# Patient Record
(demographics unavailable — no encounter records)

---

## 2024-10-22 NOTE — HISTORY OF PRESENT ILLNESS
[Lower back] : lower back [8] : 8 [Dull/Aching] : dull/aching [Intermittent] : intermittent [Household chores] : household chores [Nothing helps with pain getting better] : Nothing helps with pain getting better [Standing] : standing [Walking] : walking [Lying in bed] : lying in bed [FreeTextEntry1] : L5-S1 LESI - 05/06/2024  LT L4-5, L5-S1 TFESI - 03/19/2024 [] : no [FreeTextEntry6] : soreness [FreeTextEntry7] : left side  [de-identified] : L MRI

## 2024-10-22 NOTE — DISCUSSION/SUMMARY
[de-identified] : proceed L5S1 LESI  After discussing various treatment options with the patient including but not limited to oral medications, physical therapy, exercise, modalities as well as interventional spinal injections, we have decided with the following plan: I personally reviewed the MRI/CT scan images and agree with the radiologist's report. The radiological findings were discussed with the patient. The risks, benefits, contents and alternatives to injection were explained in full to the patient. Risks outlined include but are not limited to infection,sepsis, bleeding, post-dural puncture headache, nerve damage, temporary increase in pain, syncopal episode, failure to resolve symptoms, allergic reaction, symptom recurrence, and elevation of blood sugar in diabetics. Cortisone may cause immunosuppression. Patient understands the risks. All questions were answered. After discussion of options, patient requested an injection. Information regarding the injection was given to the patient. Which medications to stop prior to the injection was explained to the patient as well. Follow up in 1-2 weeks post injection for re-evaluation. Continue Home exercises, stretching, activity modification, physical therapy, and conservative care. Patient is presenting with acute/sub-acute radicular pain with impairment in ADLs and functionality. The pain has not responded to conservative care including nsaid therapy and/or physical therapy. There is no bleeding tendency, unstable medical condition, or systemic infection.

## 2024-10-22 NOTE — PHYSICAL EXAM

## 2024-11-04 NOTE — PROCEDURE
[FreeTextEntry3] : Date of Service: 11/04/2024   Account: [id number]   Patient: NURA ASHLEY   YOB: 1958   Age: 66 year     Surgeon:                                   Ke Cerrato M.D.   Pre-Operative Diagnosis:      Lumbosacral radiculitis                Post Operative Diagnosis:     Lumbosacral radiculitis                Procedure:                               Interlaminar lumbar epidural steroid injection (L5-S1) under fluoroscopic guidance   Anesthesia:                              MAC     This procedure was carried out using fluoroscopic guidance.  The risks and benefits of the procedure were discussed extensively with the patient. The consent of the patient was obtained and the following procedure was performed.   The patient was placed in the prone position.  The lumbar area was prepped and draped in a sterile fashion.  Under AP view with slight cephalad-caudad angulation, the L5-S1 interspace was identified and marked.  Using sterile technique the superficial skin was anesthetized with 1% Lidocaine without epinephrine.  A 20 gauge Tuohoy needle was advanced into the epidural space under fluoroscopy using eydxz-cguqncisn-cjiot technique and using loss of resistance at the L5-S1 level.  After negative aspiration for heme or CSF, an epidurogram was obtained using 3 cc Omnipaque contrast confirming epidural placement of the needle.   Epidurogram showed no evidence of intrathecal or intravascular flow, and good evidence of bilateral epidural flow from L3-S2 levels.  After this, 5 cc of preservative free normal saline and 80 mg of kenalog were injected into the epidural space.   The needle was subsequently removed.  Anesthesia personnel were present throughout the procedure.   The patient tolerated the procedure well and was instructed to contact me immediately if there were any problems.     Ke Cerrato M.D.

## 2024-12-13 NOTE — PROCEDURE
[FreeTextEntry3] : RIGHT SCIATIC NERVE BLOCK/PIRIFORMIS INJECTION   Pre-op diagnosis: Piriformis syndrome of left  side   Post Operative Diagnosis: Piriformis syndrome of left side   Procedure: Left Sciatic nerve Block/Piriformis injection under fluoroscopic guidance.        This procedure was carried out using fluoroscopic guidance. The risks and benefits of the procedure were discussed extensively with the patient. The consent of the patient was obtained and the following procedure was performed.   The patient was placed prone on the table. Fluoroscopy was utilized to delineate the anatomy of the sacrum and the left greater trochanter. Surface and deep landmarks were identified as well. After skin prep, drape, and local infiltration of 1.5% lidocaine, a 22 gauge spinal needle was advanced approximately half way between the sacrum and the greater trochanter. The needle was passed to a depth of maximal tenderness or until oss was contacted. 1 cc of Omnipaque was injected and lateral spread delineating the piriformis muscle was overserved. No vascular spread was noted. 40mg of kenalog with 3 cc of 0.25% Marcaine was then injected.   The patient tolerated the procedure well. Vital signs remained normal throughout the procedure. The patient was instructed to apply ice over the injection site for twenty minutes every two hours for the next 24 to 48 hours. The patient was also instructed to contact me immediately if there were any problems     Ke Cerrato M.D.

## 2025-03-13 NOTE — HISTORY OF PRESENT ILLNESS
[Lower back] : lower back [8] : 8 [Dull/Aching] : dull/aching [Intermittent] : intermittent [Household chores] : household chores [Nothing helps with pain getting better] : Nothing helps with pain getting better [Standing] : standing [Walking] : walking [Lying in bed] : lying in bed [7] : 7 [FreeTextEntry1] : LT PIRIFORMIS INJ-12/13/24  L5-S1 LESI-11/04/25  L5-S1 LESI - 05/06/2024  LT L4-5, L5-S1 TFESI - 03/19/2024 [] : no [FreeTextEntry6] : soreness [FreeTextEntry7] : left side  [de-identified] : L MRI

## 2025-03-13 NOTE — ASSESSMENT
[FreeTextEntry1] : L SCIATIC NERVE/PIRIFORMIS BLOCK WITHOUT RELIEF    PAIN IS IN L GLUT  POST THIGH AND ANKLE

## 2025-03-13 NOTE — PHYSICAL EXAM
[de-identified] : PHYSICAL EXAM  Constitutional:  Appears well, no apparent distress Ability to communicate: Normal Respiratory: non-labored breathing Skin: no rash noted Head: normocephalic, atraumatic Neck: no visible thyroid enlargement Eyes: extraocular movements intact Neurologic: alert and oriented x3 Psychiatric: normal mood, affect, and behavior  Lumbar Spine:  Palpation: left lumbar paraspinal spasm and left lumbar paraspinal tenderness to palpation. ROM: Diminished range of motion in all plains.  Patient notes pain with lateral bending to the left. MMT: Motor exam is 5/5 through out bilateral lower extremities. Sensation: Light touch and pain is intact throughout bilateral lower extremities. Reflexes: achilles and patella reflexes are intact and  symmetrical.  No sustained clonus. Special Testing: Positive straight leg raise on the left side.

## 2025-03-13 NOTE — DISCUSSION/SUMMARY
[de-identified] : pt will f.u dr serna for possible hardware removal will obtain updated MRI LS spine

## 2025-03-17 NOTE — HISTORY OF PRESENT ILLNESS
[5] : 5 [Dull/Aching] : dull/aching [Throbbing] : throbbing [Constant] : constant [de-identified] : 8/30/22: fall 2 days ago w/ foot pain. went to walk in and placed in boot. nwb in boot. no prior foot probs. no dm. quit cigarettes 2 years ago. quit vaping 1 year ago. CPA/  9/2/22: ORIF L 5th mt fx  09/12/2022:  no complaints. stepped on foot a few times while transporting items despite instructions to be nwb. Patient denies fevers, chills, or drainage.   09/29/2022: pain improving. nwb in post op shoe on rollator. Patient denies fevers, chills, or drainage.  10/10/2022:  having some discomfort, especially as the day goes on.  Had to put foot down once to keep from falling.   NWB w scooter w/ post op shoe. Patient denies fevers, chills, or drainage.  10/18/2022:  Had a fall yesterday morning and put her left foot down hard. was not wearing show at the time  10/31/2022:  pain improving. walking in boot w/ 1 crutch  11/21/2022 :  improving. walking in reg shoes. requesting PT for gait issues. Patient denies fevers, chills, or drainage.  12/19/2022: was having some pain in dorsal foot assoc w/ PT. pain now improved  2/13/2023: improving. some plantar heep/foot pain w/ activity. going to PT 1 day a week  04/24/23: going PT 1/wk. no new injury. reports pain localizing to achilles. also requesting hardware removal for pain related to shoeware  05/01/2023:  MRI results  5/16/23: PRP L achilles today  01/08/2024:  not seen in nearly 1 year. reports continued foot pain. reports ongoing tingling extending from hip down. seeing chiropractor for that  2/12/24 -- MRI f/up. no sig change  03/17/2025: not seen in a year. has been going to pain management. has ongoing pain radiating from foot to leg and posterior thigh [] : This patient has had an injection before: no [FreeTextEntry1] : left foot [de-identified] : post op shoe and scooter  [de-identified] : T[facility]  [de-identified] : 9/2/22 [de-identified] : 2/8/23 [de-identified] : xray

## 2025-03-17 NOTE — PHYSICAL EXAM
[Left] : left foot and ankle [2nd] : 2nd [3rd] : 3rd [5th] : 5th [NL (40)] : plantar flexion 40 degrees [NL 30)] : inversion 30 degrees [NL (20)] : eversion 20 degrees [5___] : [facility] 5[unfilled]/5 [2+] : dorsalis pedis pulse: 2+ [] : no metatarsal tenderness [FreeTextEntry8] : no point ttp [de-identified] : splt all distrib but dec at 1st webspace

## 2025-03-17 NOTE — ASSESSMENT
[FreeTextEntry1] : sx appear more radic and not from hardware had lumbar mri pending advised dont think removing plate would help current complaint further plan pending mri patient considering spine surgery eval as well

## 2025-05-08 NOTE — HISTORY OF PRESENT ILLNESS
[Lower back] : lower back [8] : 8 [7] : 7 [Dull/Aching] : dull/aching [Intermittent] : intermittent [Household chores] : household chores [Nothing helps with pain getting better] : Nothing helps with pain getting better [Standing] : standing [Walking] : walking [Lying in bed] : lying in bed [FreeTextEntry1] : MRI follow up-5/8/25  LT PIRIFORMIS INJ-12/13/24  L5-S1 LESI-11/04/25  L5-S1 LESI - 05/06/2024  LT L4-5, L5-S1 TFESI - 03/19/2024 [] : no [FreeTextEntry6] : soreness [FreeTextEntry7] : left side  [de-identified] : L MRI

## 2025-05-08 NOTE — PHYSICAL EXAM
[de-identified] : PHYSICAL EXAM  Constitutional:  Appears well, no apparent distress Ability to communicate: Normal Respiratory: non-labored breathing Skin: no rash noted Head: normocephalic, atraumatic Neck: no visible thyroid enlargement Eyes: extraocular movements intact Neurologic: alert and oriented x3 Psychiatric: normal mood, affect, and behavior  Lumbar Spine:  Palpation: left lumbar paraspinal spasm and left lumbar paraspinal tenderness to palpation. ROM: Diminished range of motion in all plains.  Patient notes pain with lateral bending to the left. MMT: Motor exam is 5/5 through out bilateral lower extremities. Sensation: Light touch and pain is intact throughout bilateral lower extremities. Reflexes: achilles and patella reflexes are intact and  symmetrical.  No sustained clonus. Special Testing: Positive straight leg raise on the left side.  Assessemnt: Radiculopathy of lumbosacral region (M54.17) Myalgia (M79.10)

## 2025-05-15 NOTE — REASON FOR VISIT
[FreeTextEntry2] : 05/15/2025: Patient here for raven hand pain. Patient states the right hand hurts the most.  Patient states she has had surgery on both hands before in the past. Patient states she feels like something is torn in her hand because she is having the same pain like before.

## 2025-05-15 NOTE — HISTORY OF PRESENT ILLNESS
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

## 2025-05-15 NOTE — ASSESSMENT
[FreeTextEntry1] : This is an acute uncomplicated injury that needs NSIADS, ice  I recommend the patient take over the counter medication such as Advil/Motrin/Aleve or Tylenol for pain and inflammation  MRIs both wrists to eval TFCC Return after MRIs  R ECU tendon sheath injection was performed because of pain and inflammation under aseptic conditions with betadine and alcohol Anesthesia: ethyl chloride sprayed topically Celestone 6mg/1cc: An injection of Celestone 1cc Lidocaine: An injection of Lidocaine 1% 1cc Marcaine: An injection of Marcaine 0.5% 1cc 25G needle   The risks, benefits, and alternatives to cortisone injection were explained in full to the patient. Risks outlined include but are not limited to infection, sepsis, bleeding, scarring, skin discoloration, temporary increase in pain, syncopal episode, failure to resolve symptoms, allergic reaction, symptom recurrence, and elevation of blood sugar in diabetics. Patient understood the risks. All questions were answered. After discussion of options, patient verbally consented to an injection. Sterile prep was done of the injection site. Patient tolerated the procedure well. Advised to ice the injection site this evening.   L ECU tendon sheath injection was performed because of pain and inflammation under aseptic conditions with betadine and alcohol Anesthesia: ethyl chloride sprayed topically Celestone 6mg/1cc: An injection of Celestone 1cc Lidocaine: An injection of Lidocaine 1% 1cc Marcaine: An injection of Marcaine 0.5% 1cc 25G needle   The risks, benefits, and alternatives to cortisone injection were explained in full to the patient. Risks outlined include but are not limited to infection, sepsis, bleeding, scarring, skin discoloration, temporary increase in pain, syncopal episode, failure to resolve symptoms, allergic reaction, symptom recurrence, and elevation of blood sugar in diabetics. Patient understood the risks. All questions were answered. After discussion of options, patient verbally consented to an injection. Sterile prep was done of the injection site. Patient tolerated the procedure well. Advised to ice the injection site this evening.

## 2025-05-15 NOTE — PHYSICAL EXAM
[de-identified] : R wrist: Swelling ulnar wrist  Tender TFCC Pain with pronation/supination Decreased wrist ROM +TFCC grind  Todays Xrays PA/Lateral/Oblique of the R hand shows, incidental OA  L wrist: Swelling ulnar wrist Tender TFCC Pain with pronation/supination Decreased wrist ROM +TFCC grind   Todays Xrays PA/Lateral/Oblique of the L hand shows incidental OA

## 2025-06-23 NOTE — HISTORY OF PRESENT ILLNESS
[6] : 6 [4] : 4 [Dull/Aching] : dull/aching [Throbbing] : throbbing [de-identified] : Bilateral wrist pain  Ulnar sided Injections to ECU did not help much   For the first time I independently reviewed the MRI of the R wrist [facility] 6/13/25 The clinically relevant findings shows ECU tendontiis, no TFCC tear  For the first time I independently reviewed the MRI of the L wrist [facility] 6/13/25 The clinically relevant findings shows ECU tendontiis, no TFCC tear [FreeTextEntry1] : wrists

## 2025-06-23 NOTE — REASON FOR VISIT
[FreeTextEntry2] :  06/23/2025:  67 year old female here today for: follow up bilateral wrists pain and Extensor tenosynovitis, she had MRI on 6/13/25 at [facility]

## 2025-06-23 NOTE — PHYSICAL EXAM
[de-identified] : R wrist: Swelling ulnar wrist  Tender ECU more than TFCC Pain with pronation/supination Decreased wrist ROM +TFCC grind  L wrist: Swelling ulnar wrist Tender ECU more than TFCC Pain with pronation/supination Decreased wrist ROM +TFCC grind

## 2025-06-23 NOTE — ASSESSMENT
[FreeTextEntry1] : This is an acute uncomplicated injury that needs OT, Mobic I am prescribing Mobic 15mg Daily to alleviate pain and inflammation. Patient advised to take for 1-2 weeks. They were advised of risks of medication including but not limited GI upset and high blood pressure. I prescribed OT 2-3 times a week for 4-6 weeks  Return in 4 weeks

## 2025-06-26 NOTE — DATA REVIEWED
[MRI] : MRI [Lumbar Spine] : lumbar spine [Report was reviewed and noted in the chart] : The report was reviewed and noted in the chart [I independently reviewed and interpreted images and report] : I independently reviewed and interpreted images and report [FreeTextEntry1] : Impingement L>R L5 nerve root, mild central stenosis L4/5 with anterolisthesis and NF narrowing.

## 2025-06-26 NOTE — ASSESSMENT
[FreeTextEntry1] : NURA ASHLEY is a 67 year old female presenting with LLE that radiates to lateral foot through posterior glute. Will periodically have a burning sensation in L posterior glute. Saw Dr. Cerrato for multiple MEGHA with no relief. Has been taking floyd 300mg with mild relief. No relief from PT in the past. MRI: Impingement L>R L5 nerve root, mild central stenosis L4/5 with anterolisthesis and NF narrowing. No complaints of LBP. Discussed indication for laminotomy at L4/5. at this point the benefit would be a little speculative ;  the alternative reason for the pain needs to be treated;  so I proposed PT for presumed L iliotibial band syndrome , gluteal tendonitis, GT bursitis   when I see her again in 6 weeks or so I can give her a diagnostic injection if the PT has helpful but there still remains a site of tenderness;  if treatment for the alternative diagnoses and a follow up injection are not helpful; then as long as there is not extraspinal pathology (she would need a pelvis MRI to work that up) then it would be reasonable to move forward with a left l45 laminoforaminotomy  time based;45 mins

## 2025-06-26 NOTE — HISTORY OF PRESENT ILLNESS
[Lower back] : lower back [6] : 6 [3] : 3 [Dull/Aching] : dull/aching [Frequent] : frequent [de-identified] : 6/26/25: 67 year old female presents with complaints of LLE that radiates to lateral foot through posterior glute. Will periodically have a burning sensation in L posterior glute. She was evaluated by Dr. Cerrato, received series of MEGHA with minimal improvement. Currently on gabapentin 300mg TID with mild relief. Symptoms began in Jan 2024, EMG completed at [facility] revealing left sided mild, acute and chronic L5 S1 radiculopathy. Has completed PT with no improvement, now performing HEP.   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